# Patient Record
Sex: FEMALE | Race: WHITE | Employment: FULL TIME | URBAN - METROPOLITAN AREA
[De-identification: names, ages, dates, MRNs, and addresses within clinical notes are randomized per-mention and may not be internally consistent; named-entity substitution may affect disease eponyms.]

---

## 2018-07-02 ENCOUNTER — TELEPHONE (OUTPATIENT)
Dept: NEUROLOGY | Age: 48
End: 2018-07-02

## 2018-07-02 ENCOUNTER — OFFICE VISIT (OUTPATIENT)
Dept: NEUROLOGY | Age: 48
End: 2018-07-02

## 2018-07-02 VITALS
BODY MASS INDEX: 35.07 KG/M2 | OXYGEN SATURATION: 96 % | WEIGHT: 236.8 LBS | DIASTOLIC BLOOD PRESSURE: 80 MMHG | SYSTOLIC BLOOD PRESSURE: 110 MMHG | TEMPERATURE: 98 F | HEART RATE: 85 BPM | HEIGHT: 69 IN

## 2018-07-02 DIAGNOSIS — H53.9 VISUAL CHANGES: Primary | ICD-10-CM

## 2018-07-02 DIAGNOSIS — H53.9 VISUAL CHANGES: ICD-10-CM

## 2018-07-02 DIAGNOSIS — G43.011 INTRACTABLE MIGRAINE WITHOUT AURA AND WITH STATUS MIGRAINOSUS: Primary | ICD-10-CM

## 2018-07-02 RX ORDER — MELOXICAM 15 MG/1
15 TABLET ORAL DAILY
COMMUNITY
End: 2019-11-26 | Stop reason: SDUPTHER

## 2018-07-02 RX ORDER — ALPRAZOLAM 0.25 MG/1
TABLET ORAL
COMMUNITY

## 2018-07-02 RX ORDER — DESVENLAFAXINE 100 MG/1
TABLET, EXTENDED RELEASE ORAL
COMMUNITY
End: 2019-11-26 | Stop reason: SDUPTHER

## 2018-07-02 NOTE — PROCEDURES
Carotid Doppler:     Date:  7/2/2018     Requesting Physician:  Vitor Bryant MD     Indication:  Visual disturbance     B-mode imaging reveals mild plaque at the bifurcations bilaterally. Doppler spectral analysis reveals no significant velocity shifts. Vertebral artery flow antegrade bilaterally. Interpretation:    1. Minimal plaque. 2. No significant stenosis.

## 2018-07-02 NOTE — TELEPHONE ENCOUNTER
[facility] Rx PA form w/ clinical notes faxed via [facility] key #  [id number] regarding Zembarce 3mg /0.5ml #8 syringes /30 days    PA status pending

## 2018-07-02 NOTE — TELEPHONE ENCOUNTER
----- Message from Yusra Osborne sent at 7/2/2018  2:23 PM EDT -----  Regarding: Dr. Chau Sutherland refill  The pt was just seen today and went to  the prescription she had at the pharmacy, but was told it needs prior authorization fisrt. Best contact number is [phone].

## 2018-07-02 NOTE — MR AVS SNAPSHOT
[facility] 
 
 
 [place] [address] [id number] [phone] Patient: Radha Terry MRN: [id number] :1970 Visit Information Date & Time Provider Department Dept. Phone Encounter #  
 2018  1:00 PM Leonides Robertson MD [facility] [phone] [id number] Follow-up Instructions Return in about 6 weeks (around 2018). Your Appointments 2018  1:30 PM  
Follow Up with Amalia Choudhary NP  [facility] ([facility]) Appt Note: headaches  follow up [place] [address] [id number] [phone]  
  
   
 [facility] [address] [id number] [address] Upcoming Health Maintenance Date Due DTaP/Tdap/Td series (1 - Tdap) 1993 PAP AKA CERVICAL CYTOLOGY 1993 Influenza Age 5 to Adult 2018 Allergies as of 2018  Review Complete On: 2018 By: Leonides Robertson MD  
 No Known Allergies Current Immunizations  Never Reviewed No immunizations on file. Not reviewed this visit You Were Diagnosed With   
  
 Codes Comments Intractable migraine without aura and with status migrainosus    -  Primary ICD-10-CM: F68.581 
ICD-9-CM: 346.13 Visual changes     ICD-10-CM: H53.9 ICD-9-CM: 368.9 Vitals BP Pulse Temp Height(growth percentile) Weight(growth percentile) SpO2  
 110/80 85 98 °F (36.7 °C) 5' 9\" (1.753 m) 236 lb 12.8 oz (107.4 kg) 96% BMI Smoking Status 34.97 kg/m2 Never Smoker Vitals History BMI and BSA Data Body Mass Index Body Surface Area 34.97 kg/m 2 2.29 m 2 Preferred Pharmacy Pharmacy Name Phone [facility] [address]  [facility] [phone] Your Updated Medication List  
  
   
 This list is accurate as of 18  1:41 PM.  Always use your most recent med list.  
  
  
  
  
 meloxicam 15 mg tablet Commonly known as:  MOBIC Take 15 mg by mouth daily. PRISTIQ 100 mg Tb24 Generic drug:  Desvenlafaxine Take  by mouth. SUMAtriptan succinate 3 mg/0.5 mL Pnij Commonly known as:  Tr [place]rat  
1 at HA onset and repeat in 1 hours prn. May use up to 4 in 24 hours XANAX 0.25 mg tablet Generic drug:  ALPRAZolam  
Take  by mouth. Prescriptions Sent to Pharmacy Refills SUMAtriptan succinate (ZEMBRACE SYMTOUCH) 3 mg/0.5 mL pnij 3 Si at HA onset and repeat in 1 hours prn. May use up to 4 in 24 hours Class: Normal  
 Pharmacy: [facility] [address] [place] [address] [address] AT NEC of [address] ([facility] #: [phone] Follow-up Instructions Return in about 6 weeks (around 2018). To-Do List   
 2018 Imaging:  DUPLEX CAROTID BILATERAL AMB NEURO Introducing \[facility] [place] [facility]\"" & HEALTH SERVICES! [employer] introduces [employer] patient portal. Now you can access parts of your medical record, email your doctor's office, and request medication refills online. 1. In your internet browser, go to [email or web address] 2. Click on the First Time User? Click Here link in the Sign In box. You will see the New Member Sign Up page. 3. Enter your [employer] Access Code exactly as it appears below. You will not need to use this code after youve completed the sign-up process. If you do not sign up before the expiration date, you must request a new code. · [employer] Access Code: [email or web address] Expires: 2018 12:56 PM 
 
4. Enter the last four digits of your Social Security Number (xxxx) and Date of Birth (mm/dd/yyyy) as indicated and click Submit. You will be taken to the next sign-up page. 5. Create a [place] ID.  This will be your [employer] login ID and cannot be changed, so think of one that is secure and easy to remember. 6. Create a RIVS password. You can change your password at any time. 7. Enter your Password Reset Question and Answer. This can be used at a later time if you forget your password. 8. Enter your e-mail address. You will receive e-mail notification when new information is available in [address] 9. Click Sign Up. You can now view and download portions of your medical record. 10. Click the Download Summary menu link to download a portable copy of your medical information. If you have questions, please visit the Frequently Asked Questions section of the [employer] website. Remember, [employer] is NOT to be used for urgent needs. For medical emergencies, dial 911. Now available from your iPhone and Android! Please provide this summary of care documentation to your next provider. Your primary care clinician is listed as Benito Ramsey. If you have any questions after today's visit, please call [phone].

## 2018-07-02 NOTE — PROGRESS NOTES
New patient , states dx with migraines at age 7 , age 27 until  2 Months ago   Able to deal with them. 2 months ago , started 2 times were week . 3 weeks ago started daily, feeling like someone is beating head.  Some relief with fioricet

## 2018-07-02 NOTE — PROGRESS NOTES
[facility] [address]   [place] [address]   [place], [facility] [place]note [address] [address]   [phone] [facility]    Fax             [facility]    Chief Complaint   Patient presents with    Headache     new patient     61-year-old right-handed woman who presents today for evaluation of headaches. She says she was diagnosed with migraine at the age of 7years old. She at that time used Imitrex tablets. Her headaches were fairly frequent and then after she had the birth of her first child she would get one migraine about every 3 months or so. Recently her headaches have significantly changed in terms of frequency. She does note that her mother passed away unexpectedly on April 6 of this year and she was the one to find her. A week later her daughter had her first grandchild and almost had a stroke and there were complications with the baby etc.  She does realize that stress may be playing a role. She does say that she has tempered her stress by taking 2 weeks off from work where she is a [address]. She gets 8-10 hours of sleep nightly. She does meditation and mindfulness. Still she has had frequent headaches. She notes that she is having a headache about 4-5 days a week and about 2 weeks ago when the increase she was having 2-3 headache days per week. She has gone to the emergency department and [facility] for cocktails. She had a head CT that was negative. At [facility] they did an ultrasound of her optic nerve and told her there was a problem there. She did show me a picture of that but I was unable to ascertain what that meant. In any regard she has also had a 14 day steroid pack which seemed to help but then when she titrated down to 2 tablets a day her headaches resumed. No focal deficits. No loss or alteration in consciousness. No numbness or tingling. No palpitations. No cardiac history. Blood pressure and cholesterol are controlled.   She believes she is handling her increased stress with activities as noted. No sleep disruption. No symptoms suggestive of obstructive sleep apnea. She was given Fioricet and noted when she would take one it would seem to dull the headache somewhat but never took it away. Previously when she would get a bad headache she would use 800 mg of Motrin as she took that early she was able to abort the headache but if she did not take it early it would last for days. She has associated photophobia and phonophobia. No significant nausea or vomiting. She is not taking over-the-counter medications. None of her other medications of change she is on Pristiq and Xanax for her anxiety and she has been on that for 5-6 years. She is also on some meloxicam for her arthritis. No recent injury. No fever. No chills. Past Medical History:   Diagnosis Date    Anxiety     Arthritis     Depression     Joint pain     Musculoskeletal disorder     Nausea & vomiting        History reviewed. No pertinent surgical history. Current Outpatient Prescriptions   Medication Sig Dispense Refill    Desvenlafaxine (PRISTIQ) 100 mg Tb24 Take  by mouth.  meloxicam (MOBIC) 15 mg tablet Take 15 mg by mouth daily.  ALPRAZolam (XANAX) 0.25 mg tablet Take  by mouth.  SUMAtriptan succinate (ZEMBRACE SYMTOUCH) 3 mg/0.5 mL pnij 1 at HA onset and repeat in 1 hours prn.   May use up to 4 in 24 hours 8 Syringe 3        No Known Allergies    Social History   Substance Use Topics    Smoking status: Never Smoker    Smokeless tobacco: Never Used    Alcohol use No       Family History   Problem Relation Age of Onset    Cancer Mother     Migraines Mother     Headache Mother     Heart Disease Mother     Stroke Mother     Diabetes Mother        Review of Systems  Pertinent positives and negatives as noted with remainder of comprehensive review negative    Examination  Visit Vitals    /80    Pulse 85    Temp 98 °F (36.7 °C)    Ht 5' 9\" (1.753 m)    Wt 107.4 kg (236 lb 12.8 oz)    SpO2 96%    BMI 34.97 kg/m2     Pleasant, well appearing. No icterus. Oropharynx clear. Supple neck without bruit. Heart regular. No murmur. No edema. Neurologically, she is awake, alert, and oriented with normal speech and language. Her cognition is normal.    Intact cranial nerves 2-12. No nystagmus. Visual fields full to confrontation. Disk margins are flat bilaterally. She has normal bulk and tone. She has no abnormal movement. She has no pronation or drift. She generates full strength in the upper and lower extremities to direct confrontational testing. Reflexes are symmetrical in the upper and lower extremities bilaterally. Her toes are down bilaterally. No [place]. Finger nose finger and rapid alternating movements are normal.  Steady gait. No sensory deficit to primary modalities. Impression/Plan  Very pleasant 53-year-old woman with migraine without aura and with status migrainosus severe to the point that she has had several trips to the emergency department as well as to urgent care and after discussing her headache pattern and frequency of appears to be that she is just unable to successfully abort a headache when it comes and the majority of her headache days are secondary to ongoing headache i.e. status migrainosus. We discussed this today at length. We discussed preventatives. Given the fact that this seems like just ongoing headache and not multiple discrete headaches we will try to abort the headaches more effectively when they present. We discussed options and have decided upon Zembrace 3 mg injection discussing the fact that injectable medications are the fastest onset of the medications we have, and I demonstrated the injector, and we discussed potential side effects, potential benefits and alternatives. Track headaches on a calendar bring that each appointment.   If we are not successful in terminating these headaches abruptly and she continues to have multiple headache days per month we will put her on a preventative. Choices would be topiramate or Aimovig. Secondary to the abnormality that was felt to be seen in the optic nerve or in the eye we will proceed with a Doppler to ensure there is no stenosis affecting the stomach artery. Return in 6 weeks    Danish Clemons MD      This note was created using voice recognition software. Despite editing, there may be syntax errors. This note will not be viewable in [address]

## 2018-07-03 ENCOUNTER — TELEPHONE (OUTPATIENT)
Dept: NEUROLOGY | Age: 48
End: 2018-07-03

## 2018-07-03 NOTE — TELEPHONE ENCOUNTER
Received [facility] Rx PA denial letter regarding Zembrace 3mg/0.5ml #8 syringes /30 days  Reason: patient did not meet the following:   Patient must have tried failed sumatriptan injection       PA case closed unfavorable for patient     Clinical staff has been informed of this matter

## 2018-07-06 ENCOUNTER — TELEPHONE (OUTPATIENT)
Dept: NEUROLOGY | Age: 48
End: 2018-07-06

## 2018-07-06 NOTE — TELEPHONE ENCOUNTER
Pt was returning nurses call. She did advise that she is ok with any RX that you want to try. She said that an E-script can be sent on the RX that is needed. Her best contact is [phone]. Thanks.

## 2018-07-09 ENCOUNTER — TELEPHONE (OUTPATIENT)
Dept: NEUROLOGY | Age: 48
End: 2018-07-09

## 2018-07-09 RX ORDER — SUMATRIPTAN 6 MG/.5ML
6 INJECTION, SOLUTION SUBCUTANEOUS ONCE
Qty: 0.5 ML | Refills: 2 | OUTPATIENT
Start: 2018-07-09 | End: 2018-07-09

## 2018-07-09 NOTE — TELEPHONE ENCOUNTER
Pt notified VO for Imitrex 6 mg injection 1 at HA onset and repeat in 1 hour PRN x 1 max 2 in 24 hours  Number 6 with 2 RF's

## 2018-07-09 NOTE — TELEPHONE ENCOUNTER
----- Message from Kandi Perla sent at 7/9/2018 12:40 PM EDT -----  Regarding: Dr. Joss Doss  The patient is requesting a call back from the nurse with the status of the new medication that is supposed to be called into the pharmacy for her.  [phone]

## 2018-07-09 NOTE — TELEPHONE ENCOUNTER
Received Duplicate PA request regarding zembrace 3mg /0.5 ml #8 syringes /30 days  PA case closed unfavorable for patient on 7/3/18   Reason: patient must try the preferred sumatriptan injectable first     Clinical Staff and [employer] [facility] [employer] ( via Fax)  has been informed of this matter   PA case closed unfavorable for patient

## 2019-11-26 ENCOUNTER — HOSPITAL ENCOUNTER (OUTPATIENT)
Dept: LAB | Age: 49
Discharge: HOME | End: 2019-11-26

## 2019-11-26 ENCOUNTER — OFFICE VISIT (OUTPATIENT)
Dept: FAMILY MEDICINE CLINIC | Age: 49
End: 2019-11-26

## 2019-11-26 VITALS
BODY MASS INDEX: 35.1 KG/M2 | SYSTOLIC BLOOD PRESSURE: 97 MMHG | TEMPERATURE: 98.4 F | RESPIRATION RATE: 18 BRPM | DIASTOLIC BLOOD PRESSURE: 65 MMHG | OXYGEN SATURATION: 98 % | WEIGHT: 237 LBS | HEIGHT: 69 IN | HEART RATE: 72 BPM

## 2019-11-26 DIAGNOSIS — B35.1 FUNGAL TOENAIL INFECTION: ICD-10-CM

## 2019-11-26 DIAGNOSIS — Z00.00 WELL ADULT EXAM: Primary | ICD-10-CM

## 2019-11-26 DIAGNOSIS — F33.9 EPISODE OF RECURRENT MAJOR DEPRESSIVE DISORDER, UNSPECIFIED DEPRESSION EPISODE SEVERITY (HCC): ICD-10-CM

## 2019-11-26 DIAGNOSIS — Z12.31 VISIT FOR SCREENING MAMMOGRAM: ICD-10-CM

## 2019-11-26 DIAGNOSIS — Z00.00 WELL ADULT EXAM: ICD-10-CM

## 2019-11-26 LAB
ALBUMIN SERPL-MCNC: 3.8 G/DL (ref 3.5–5)
ALBUMIN/GLOB SERPL: 1.2 {RATIO} (ref 1.1–2.2)
ALP SERPL-CCNC: 82 U/L (ref 45–117)
ALT SERPL-CCNC: 18 U/L (ref 12–78)
ANION GAP SERPL CALC-SCNC: 5 MMOL/L (ref 5–15)
AST SERPL-CCNC: 12 U/L (ref 15–37)
BASOPHILS # BLD: 0 K/UL (ref 0–0.1)
BASOPHILS NFR BLD: 1 % (ref 0–1)
BILIRUB SERPL-MCNC: 0.6 MG/DL (ref 0.2–1)
BUN SERPL-MCNC: 17 MG/DL (ref 6–20)
BUN/CREAT SERPL: 21 (ref 12–20)
CALCIUM SERPL-MCNC: 8.6 MG/DL (ref 8.5–10.1)
CHLORIDE SERPL-SCNC: 109 MMOL/L (ref 97–108)
CHOLEST SERPL-MCNC: 182 MG/DL
CO2 SERPL-SCNC: 27 MMOL/L (ref 21–32)
CREAT SERPL-MCNC: 0.8 MG/DL (ref 0.55–1.02)
DIFFERENTIAL METHOD BLD: NORMAL
EOSINOPHIL # BLD: 0.2 K/UL (ref 0–0.4)
EOSINOPHIL NFR BLD: 4 % (ref 0–7)
ERYTHROCYTE [DISTWIDTH] IN BLOOD BY AUTOMATED COUNT: 12.6 % (ref 11.5–14.5)
GLOBULIN SER CALC-MCNC: 3.2 G/DL (ref 2–4)
GLUCOSE SERPL-MCNC: 85 MG/DL (ref 65–100)
HCT VFR BLD AUTO: 43 % (ref 35–47)
HDLC SERPL-MCNC: 66 MG/DL
HDLC SERPL: 2.8 {RATIO} (ref 0–5)
HGB BLD-MCNC: 13.5 G/DL (ref 11.5–16)
IMM GRANULOCYTES # BLD AUTO: 0 K/UL (ref 0–0.04)
IMM GRANULOCYTES NFR BLD AUTO: 0 % (ref 0–0.5)
LDLC SERPL CALC-MCNC: 108 MG/DL (ref 0–100)
LIPID PROFILE,FLP: ABNORMAL
LYMPHOCYTES # BLD: 1.3 K/UL (ref 0.8–3.5)
LYMPHOCYTES NFR BLD: 25 % (ref 12–49)
MCH RBC QN AUTO: 29.3 PG (ref 26–34)
MCHC RBC AUTO-ENTMCNC: 31.4 G/DL (ref 30–36.5)
MCV RBC AUTO: 93.5 FL (ref 80–99)
MONOCYTES # BLD: 0.4 K/UL (ref 0–1)
MONOCYTES NFR BLD: 8 % (ref 5–13)
NEUTS SEG # BLD: 3.3 K/UL (ref 1.8–8)
NEUTS SEG NFR BLD: 62 % (ref 32–75)
NRBC # BLD: 0 K/UL (ref 0–0.01)
NRBC BLD-RTO: 0 PER 100 WBC
PLATELET # BLD AUTO: 296 K/UL (ref 150–400)
PMV BLD AUTO: 11.1 FL (ref 8.9–12.9)
POTASSIUM SERPL-SCNC: 4.5 MMOL/L (ref 3.5–5.1)
PROT SERPL-MCNC: 7 G/DL (ref 6.4–8.2)
RBC # BLD AUTO: 4.6 M/UL (ref 3.8–5.2)
SODIUM SERPL-SCNC: 141 MMOL/L (ref 136–145)
TRIGL SERPL-MCNC: 40 MG/DL (ref ?–150)
TSH SERPL DL<=0.05 MIU/L-ACNC: 0.85 UIU/ML (ref 0.36–3.74)
VLDLC SERPL CALC-MCNC: 8 MG/DL
WBC # BLD AUTO: 5.4 K/UL (ref 3.6–11)

## 2019-11-26 RX ORDER — TERBINAFINE HYDROCHLORIDE 250 MG/1
250 TABLET ORAL DAILY
Qty: 90 TAB | Refills: 0 | Status: SHIPPED | OUTPATIENT
Start: 2019-11-26 | End: 2020-02-24

## 2019-11-26 RX ORDER — DESVENLAFAXINE 100 MG/1
1 TABLET, EXTENDED RELEASE ORAL DAILY
Qty: 90 TAB | Refills: 3 | Status: SHIPPED | OUTPATIENT
Start: 2019-11-26 | End: 2020-12-22

## 2019-11-26 RX ORDER — BUPROPION HYDROCHLORIDE 150 MG/1
150 TABLET ORAL
Qty: 30 TAB | Refills: 5 | Status: SHIPPED | OUTPATIENT
Start: 2019-11-26 | End: 2020-07-01

## 2019-11-26 RX ORDER — MELOXICAM 15 MG/1
15 TABLET ORAL DAILY
Qty: 90 TAB | Refills: 3 | Status: SHIPPED | OUTPATIENT
Start: 2019-11-26

## 2019-11-26 NOTE — PROGRESS NOTES
Subjective:   54 y.o. female for Well Woman Check. She is a new patient to the practice today. Her gyne and breast care is done elsewhere by her Ob-Gyne physician. There are no active problems to display for this patient. Current Outpatient Medications   Medication Sig Dispense Refill    L. gasseri-B. bifidum-B longum (HIGGINS' Jahu 85) 1.5 billion cell cap Take  by mouth.  levonorgestrel (MIRENA) 20 mcg/24 hours (5 yrs) 52 mg IUD 1 Device by IntraUTERine route once.  terbinafine HCl (LAMISIL) 250 mg tablet Take 1 Tab by mouth daily for 90 days. 90 Tab 0    buPROPion XL (WELLBUTRIN XL) 150 mg tablet Take 1 Tab by mouth every morning. 30 Tab 5    meloxicam (MOBIC) 15 mg tablet Take 1 Tab by mouth daily. 90 Tab 3    Desvenlafaxine (PRISTIQ) 100 mg Tb24 Take 1 Tab by mouth daily. 90 Tab 3    ALPRAZolam (XANAX) 0.25 mg tablet Take  by mouth.  SUMAtriptan succinate (ZEMBRACE SYMTOUCH) 3 mg/0.5 mL pnij 1 at HA onset and repeat in 1 hours prn. May use up to 4 in 24 hours 6 Syringe 3     Family History   Problem Relation Age of Onset    Cancer Mother     Migraines Mother     Headache Mother     Heart Disease Mother     Stroke Mother     Diabetes Mother      Social History     Tobacco Use    Smoking status: Never Smoker    Smokeless tobacco: Never Used   Substance Use Topics    Alcohol use: Yes     Comment: sometimes         ROS: Feeling generally well. No TIA's or unusual headaches, no dysphagia. No prolonged cough. No dyspnea or chest pain on exertion. No abdominal pain, change in bowel habits, black or bloody stools. No urinary tract symptoms. No new or unusual musculoskeletal symptoms. Specific concerns today: needs to get refills of meds; she thinks she needs to add something to the Pristiq for increase in stress and depression. Objective: The patient appears well, alert, oriented x 3, in no distress.   Visit Vitals  BP 97/65 (BP 1 Location: Left arm, BP Patient Position: Sitting)   Pulse 72   Temp 98.4 °F (36.9 °C) (Oral)   Resp 18   Ht 5' 9\" (1.753 m)   Wt 237 lb (107.5 kg)   SpO2 98%   BMI 35.00 kg/m²     ENT normal.  Neck supple. No adenopathy or thyromegaly. DIANA. Lungs are clear, good air entry, no wheezes, rhonchi or rales. S1 and S2 normal, no murmurs, regular rate and rhythm. Abdomen soft without tenderness, guarding, mass or organomegaly. Extremities show no edema, normal peripheral pulses. Neurological is normal, no focal findings. Breast and Pelvic exams are deferred. Assessment/Plan:   Well Woman  lose weight, increase physical activity, follow low fat diet, follow low salt diet, routine labs ordered  Encounter Diagnoses   Name Primary?  Well adult exam Yes    Fungal toenail infection     Episode of recurrent major depressive disorder, unspecified depression episode severity ([facility] [address]     Visit for screening mammogram      Orders Placed This Encounter    SHANNON MAMMO BI SCREENING INCL CAD    LIPID PANEL    CBC WITH AUTOMATED DIFF    METABOLIC PANEL, COMPREHENSIVE    TSH 3RD GENERATION    L. gasseri-B. bifidum-B longum (HIGGINS' Jahu 85) 1.5 billion cell cap    levonorgestrel (MIRENA) 20 mcg/24 hours (5 yrs) 52 mg IUD    terbinafine HCl (LAMISIL) 250 mg tablet    buPROPion XL (WELLBUTRIN XL) 150 mg tablet    meloxicam (MOBIC) 15 mg tablet    Desvenlafaxine (PRISTIQ) 100 mg Tb24     Labs updated  Given wellbutrin for added depression benefit  Reviewed adr/se of med  Dev plan with lab results    I have discussed the diagnosis with the patient and the intended plan as seen in the above orders. The patient has received an after-visit summary and questions were answered concerning future plans. Patient conveyed understanding of the plan at the time of the visit.     Jama Kaiser, MSN, ANP  11/26/2019

## 2019-11-26 NOTE — PROGRESS NOTES
Chief Complaint   Patient presents with    Establish Care    Ingrown Toenail    Depression     Pt in office today to establish care  -pt has concerns that she might have an ingrown toenail  -pt states that she has been having more issues with her depression  -pt states that she would like to see about adding something     1. Have you been to the ER, urgent care clinic since your last visit? Hospitalized since your last visit? Pt used online doctor for uti    2. Have you seen or consulted any other health care providers outside of the [address] [place] [address] since your last visit? Include any pap smears or colon screening.  No     Pt has no other concerns

## 2019-11-29 NOTE — PROGRESS NOTES
Hey there, your labs look great overall. The cholesterol is borderline high. Watch the diet for red meat/pork, dairy products, and fried/fast foods. Recheck 6 months.    Mary Perez

## 2020-07-01 RX ORDER — BUPROPION HYDROCHLORIDE 150 MG/1
TABLET ORAL
Qty: 30 TAB | Refills: 5 | Status: SHIPPED | OUTPATIENT
Start: 2020-07-01 | End: 2020-09-21 | Stop reason: SDUPTHER

## 2020-09-21 RX ORDER — BUPROPION HYDROCHLORIDE 150 MG/1
TABLET ORAL
Qty: 30 TAB | Refills: 5 | Status: SHIPPED | OUTPATIENT
Start: 2020-09-21 | End: 2020-10-22 | Stop reason: SDUPTHER

## 2020-10-22 RX ORDER — BUPROPION HYDROCHLORIDE 150 MG/1
TABLET ORAL
Qty: 90 TAB | Refills: 3 | Status: SHIPPED | OUTPATIENT
Start: 2020-10-22

## 2020-12-22 RX ORDER — DESVENLAFAXINE 100 MG/1
TABLET, EXTENDED RELEASE ORAL
Qty: 90 TAB | Refills: 1 | Status: SHIPPED | OUTPATIENT
Start: 2020-12-22

## 2021-07-21 ENCOUNTER — TRANSCRIBE ORDER (OUTPATIENT)
Dept: SCHEDULING | Age: 51
End: 2021-07-21

## 2021-07-21 DIAGNOSIS — Z12.31 SCREENING MAMMOGRAM FOR HIGH-RISK PATIENT: Primary | ICD-10-CM

## 2021-07-22 ENCOUNTER — HOSPITAL ENCOUNTER (OUTPATIENT)
Dept: MAMMOGRAPHY | Age: 51
Discharge: HOME | End: 2021-07-22
Attending: NURSE PRACTITIONER
Payer: COMMERCIAL

## 2021-07-22 DIAGNOSIS — Z12.31 SCREENING MAMMOGRAM FOR HIGH-RISK PATIENT: ICD-10-CM

## 2021-07-22 PROCEDURE — 77063 BREAST TOMOSYNTHESIS BI: CPT

## 2022-09-09 ENCOUNTER — APPOINTMENT (OUTPATIENT)
Dept: GENERAL RADIOLOGY | Age: 52
End: 2022-09-09
Attending: EMERGENCY MEDICINE

## 2022-09-09 ENCOUNTER — HOSPITAL ENCOUNTER (EMERGENCY)
Age: 52
Discharge: HOME | End: 2022-09-09
Attending: EMERGENCY MEDICINE

## 2022-09-09 VITALS
DIASTOLIC BLOOD PRESSURE: 71 MMHG | OXYGEN SATURATION: 97 % | HEIGHT: 69 IN | HEART RATE: 64 BPM | SYSTOLIC BLOOD PRESSURE: 128 MMHG | RESPIRATION RATE: 16 BRPM | WEIGHT: 220 LBS | BODY MASS INDEX: 32.58 KG/M2 | TEMPERATURE: 97.7 F

## 2022-09-09 DIAGNOSIS — R06.2 WHEEZING: ICD-10-CM

## 2022-09-09 DIAGNOSIS — J06.9 VIRAL UPPER RESPIRATORY TRACT INFECTION: Primary | ICD-10-CM

## 2022-09-09 LAB
ALBUMIN SERPL-MCNC: 3.4 G/DL (ref 3.5–5)
ALBUMIN/GLOB SERPL: 0.9 {RATIO} (ref 1.1–2.2)
ALP SERPL-CCNC: 92 U/L (ref 45–117)
ALT SERPL-CCNC: 22 U/L (ref 12–78)
ANION GAP SERPL CALC-SCNC: 5 MMOL/L (ref 5–15)
AST SERPL-CCNC: 13 U/L (ref 15–37)
ATRIAL RATE: 59 BPM
BASOPHILS # BLD: 0.1 K/UL (ref 0–0.1)
BASOPHILS NFR BLD: 1 % (ref 0–1)
BILIRUB SERPL-MCNC: 0.4 MG/DL (ref 0.2–1)
BUN SERPL-MCNC: 17 MG/DL (ref 6–20)
BUN/CREAT SERPL: 19 (ref 12–20)
CALCIUM SERPL-MCNC: 8.7 MG/DL (ref 8.5–10.1)
CALCULATED P AXIS, ECG09: 0 DEGREES
CALCULATED R AXIS, ECG10: 41 DEGREES
CALCULATED T AXIS, ECG11: 38 DEGREES
CHLORIDE SERPL-SCNC: 110 MMOL/L (ref 97–108)
CO2 SERPL-SCNC: 24 MMOL/L (ref 21–32)
COMMENT, HOLDF: NORMAL
CREAT SERPL-MCNC: 0.88 MG/DL (ref 0.55–1.02)
DIAGNOSIS, 93000: NORMAL
DIFFERENTIAL METHOD BLD: ABNORMAL
EOSINOPHIL # BLD: 0.3 K/UL (ref 0–0.4)
EOSINOPHIL NFR BLD: 3 % (ref 0–7)
ERYTHROCYTE [DISTWIDTH] IN BLOOD BY AUTOMATED COUNT: 13.2 % (ref 11.5–14.5)
GLOBULIN SER CALC-MCNC: 3.8 G/DL (ref 2–4)
GLUCOSE SERPL-MCNC: 90 MG/DL (ref 65–100)
HCT VFR BLD AUTO: 43.8 % (ref 35–47)
HGB BLD-MCNC: 14.5 G/DL (ref 11.5–16)
IMM GRANULOCYTES # BLD AUTO: 0.1 K/UL (ref 0–0.04)
IMM GRANULOCYTES NFR BLD AUTO: 1 % (ref 0–0.5)
LYMPHOCYTES # BLD: 2.4 K/UL (ref 0.8–3.5)
LYMPHOCYTES NFR BLD: 25 % (ref 12–49)
MAGNESIUM SERPL-MCNC: 2.2 MG/DL (ref 1.6–2.4)
MCH RBC QN AUTO: 29.2 PG (ref 26–34)
MCHC RBC AUTO-ENTMCNC: 33.1 G/DL (ref 30–36.5)
MCV RBC AUTO: 88.1 FL (ref 80–99)
MONOCYTES # BLD: 0.6 K/UL (ref 0–1)
MONOCYTES NFR BLD: 7 % (ref 5–13)
NEUTS SEG # BLD: 6.1 K/UL (ref 1.8–8)
NEUTS SEG NFR BLD: 63 % (ref 32–75)
NRBC # BLD: 0 K/UL (ref 0–0.01)
NRBC BLD-RTO: 0 PER 100 WBC
P-R INTERVAL, ECG05: 150 MS
PLATELET # BLD AUTO: 363 K/UL (ref 150–400)
PMV BLD AUTO: 9.9 FL (ref 8.9–12.9)
POTASSIUM SERPL-SCNC: 4.2 MMOL/L (ref 3.5–5.1)
PROT SERPL-MCNC: 7.2 G/DL (ref 6.4–8.2)
Q-T INTERVAL, ECG07: 404 MS
QRS DURATION, ECG06: 86 MS
QTC CALCULATION (BEZET), ECG08: 399 MS
RBC # BLD AUTO: 4.97 M/UL (ref 3.8–5.2)
SAMPLES BEING HELD,HOLD: NORMAL
SODIUM SERPL-SCNC: 139 MMOL/L (ref 136–145)
TROPONIN-HIGH SENSITIVITY: 6 NG/L (ref 0–51)
VENTRICULAR RATE, ECG03: 59 BPM
WBC # BLD AUTO: 9.6 K/UL (ref 3.6–11)

## 2022-09-09 PROCEDURE — 94640 AIRWAY INHALATION TREATMENT: CPT

## 2022-09-09 PROCEDURE — 85025 COMPLETE CBC W/AUTO DIFF WBC: CPT

## 2022-09-09 PROCEDURE — 93005 ELECTROCARDIOGRAM TRACING: CPT

## 2022-09-09 PROCEDURE — 80053 COMPREHEN METABOLIC PANEL: CPT

## 2022-09-09 PROCEDURE — 36415 COLL VENOUS BLD VENIPUNCTURE: CPT

## 2022-09-09 PROCEDURE — 74011000250 HC RX REV CODE- 250: Performed by: EMERGENCY MEDICINE

## 2022-09-09 PROCEDURE — 84484 ASSAY OF TROPONIN QUANT: CPT

## 2022-09-09 PROCEDURE — 99285 EMERGENCY DEPT VISIT HI MDM: CPT

## 2022-09-09 PROCEDURE — 71046 X-RAY EXAM CHEST 2 VIEWS: CPT

## 2022-09-09 PROCEDURE — 83735 ASSAY OF MAGNESIUM: CPT

## 2022-09-09 RX ORDER — ALBUTEROL SULFATE 90 UG/1
2 AEROSOL, METERED RESPIRATORY (INHALATION)
Qty: 18 G | Refills: 0 | Status: SHIPPED | OUTPATIENT
Start: 2022-09-09

## 2022-09-09 RX ORDER — IPRATROPIUM BROMIDE AND ALBUTEROL SULFATE 2.5; .5 MG/3ML; MG/3ML
3 SOLUTION RESPIRATORY (INHALATION)
Status: COMPLETED | OUTPATIENT
Start: 2022-09-09 | End: 2022-09-09

## 2022-09-09 RX ADMIN — IPRATROPIUM BROMIDE AND ALBUTEROL SULFATE 3 ML: .5; 3 SOLUTION RESPIRATORY (INHALATION) at 11:14

## 2022-09-09 NOTE — ED TRIAGE NOTES
Pt reports nasal and chest congestion with chest tightness and wheezing worsening over the past few days. Referred here by [facility] for an abnormal EKG. Recent antibiotic and steroid course for ear infection and congestion. Denies chest pain or SOB.

## 2022-09-09 NOTE — ED PROVIDER NOTES
Alberta Torres is a 49 yo F with recent URI and otitis media recently completed Augmentin and prednisone who presents to the ED From [facility] for concern for chest tightness with EKG changes. She sates her nasal and chest congestion ad increased over the past few days and today she also noticed wheezing which had prompted her to go to [facility] to inquire whether she needed albuterol. She denies prior history of albuterol use. Past Medical History:   Diagnosis Date    Anxiety     Arthritis     Depression     Joint pain     Musculoskeletal disorder     Nausea & vomiting        No past surgical history on file. Family History:   Problem Relation Age of Onset    Cancer Mother     Migraines Mother     Headache Mother     Heart Disease Mother     Stroke Mother     Diabetes Mother     Breast Cancer Mother     Breast Cancer Paternal Grandmother        Social History     Socioeconomic History    Marital status:      Spouse name: Not on file    Number of children: Not on file    Years of education: Not on file    Highest education level: Not on file   Occupational History    Not on file   Tobacco Use    Smoking status: Never    Smokeless tobacco: Never   Substance and Sexual Activity    Alcohol use: Yes     Comment: sometimes    Drug use: Never    Sexual activity: Yes     Partners: Male     Birth control/protection: I.U.D. Other Topics Concern    Not on file   Social History Narrative    Not on file     Social Determinants of Health     Financial Resource Strain: Not on file   Food Insecurity: Not on file   Transportation Needs: Not on file   Physical Activity: Not on file   Stress: Not on file   Social Connections: Not on file   Intimate Partner Violence: Not on file   Housing Stability: Not on file         ALLERGIES: Patient has no known allergies. Review of Systems   Constitutional:  Negative for fever. HENT:  Positive for congestion and sinus pressure. Negative for sore throat.     Eyes: Negative for visual disturbance. Respiratory:  Positive for cough and chest tightness. Cardiovascular:  Negative for chest pain. Gastrointestinal:  Negative for abdominal pain. Genitourinary:  Negative for dysuria. Musculoskeletal:  Negative for back pain. Skin:  Negative for rash. Neurological:  Negative for headaches. Vitals:    09/09/22 1059   BP: 128/71   Pulse: 64   Resp: 16   Temp: 97.7 °F (36.5 °C)   SpO2: 97%   Weight: 99.8 kg (220 lb)   Height: 5' 9\" (1.753 m)            Physical Exam  Vitals and nursing note reviewed. Constitutional:       General: She is not in acute distress. Appearance: She is well-developed. HENT:      Head: Normocephalic and atraumatic. Mouth/Throat:      Mouth: Mucous membranes are moist.   Eyes:      Extraocular Movements: Extraocular movements intact. Conjunctiva/sclera: Conjunctivae normal.   Neck:      Trachea: Phonation normal.   Cardiovascular:      Rate and Rhythm: Normal rate. Pulmonary:      Effort: Pulmonary effort is normal. No respiratory distress. Breath sounds: Examination of the right-upper field reveals wheezing. Examination of the right-middle field reveals wheezing. Wheezing present. Abdominal:      General: There is no distension. Musculoskeletal:         General: No tenderness. Normal range of motion. Cervical back: Normal range of motion. Skin:     General: Skin is warm and dry. Neurological:      General: No focal deficit present. Mental Status: She is alert. She is not disoriented. Motor: No abnormal muscle tone. ED EKG interpretation:  Rhythm: sinus bradycardia; and regular . Rate (approx.): 59; Axis: normal; P wave: normal; QRS interval: normal ; ST/T wave: normal; Other findings: otherwise normal. This EKG was interpreted by Ana Lewis MD,ED Provider. [facility]         12:31 PM  PAtient reassessed and feels much better. No wheezing after duoneb. CXR normal.  Troponin 6.   Will discharge home with prescription for albuterol MDI.      Procedures

## 2024-07-30 ENCOUNTER — OFFICE VISIT (OUTPATIENT)
Age: 54
End: 2024-07-30

## 2024-07-30 VITALS
SYSTOLIC BLOOD PRESSURE: 107 MMHG | RESPIRATION RATE: 20 BRPM | DIASTOLIC BLOOD PRESSURE: 65 MMHG | WEIGHT: 234.2 LBS | TEMPERATURE: 98.6 F | BODY MASS INDEX: 34.69 KG/M2 | HEART RATE: 88 BPM | OXYGEN SATURATION: 95 % | HEIGHT: 69 IN

## 2024-07-30 DIAGNOSIS — J02.9 SORE THROAT: Primary | ICD-10-CM

## 2024-07-30 RX ORDER — ONDANSETRON 4 MG/1
4 TABLET, FILM COATED ORAL 3 TIMES DAILY PRN
Qty: 15 TABLET | Refills: 0 | Status: SHIPPED | OUTPATIENT
Start: 2024-07-30

## 2024-07-30 ASSESSMENT — ENCOUNTER SYMPTOMS
SHORTNESS OF BREATH: 0
RHINORRHEA: 1
COUGH: 1
SORE THROAT: 1
WHEEZING: 0
CHEST TIGHTNESS: 0

## 2024-07-30 NOTE — PATIENT INSTRUCTIONS
Please follow up with your primary care provider within 2-5 days if your signs and symptoms have not resolved or worsened.     Please go immediately to the Emergency Department if you develop shortness of breath, chest pain and uncontrollable fever above 100.4F.     Nasal Congestion:  Flonase (over the counter) nasal spray, once a day  Saline irrigation kits help wash out sinuses 1-2 times a day  Normal saline nasal spray     Cough:  Throat lozenges, hot tea, and honey may help  Vicks VapoRub at night to help with cough and relieve muscles aches and pain  If not prescribed a cough medication, Delsym is an option.  It is an over the counter cough medication containing dextromethorphan to help suppress cough at night              If you have high blood pressure, take Coricidin HBP (or the generic form) instead.  Follow instructions on the box.     Congestion:  For thick mucus, take Mucinex (with Guafenesin only) to help thin the mucus.  Follow instructions on the box.  You will need to drink plenty of water with this medication.     Sore Throat:  Lozenges, as needed. Cepacol lozenges will help numb the throat  Chloraseptic spray also helps to numb throat pain  Salt water gargles to soothe throat pain     Sinus pain/pressure:  Warm, wet towel on face to help with facial sinus pain/pressure  Neti Pot or Saline Rinse can be helpful      Headache/Pain Fever/Body Aches:  If you can take NSAIDs, take Ibuprofen 400-800mg every 8 hours as needed  If you cannot take NSAIDs, take Tylenol 325-500mg every 6 hours as needed     Miscellanous:  Zyrtec/Xyzal/Allegra/Claritin during the day or Benadryl at night may help with allergies  Simple foods like chicken noodle soup, smoothies, hot tea with lemon and honey may also help          Thank you for choosing [facility].  I hope you feel better soon!

## 2024-07-30 NOTE — PROGRESS NOTES
2024   Rivka Quiorz (: 1970) is a 54 y.o. female, New patient, here for evaluation of the following chief complaint(s):  Congestion and Pharyngitis (Patient traveled to [facility], [facility] started ,  intense HA, congestion, body aches, home covid test at noon yesterday was negative. Took OTC ibuprofen and her migraine medicine, not working.)     ASSESSMENT/PLAN:  Below is the assessment and plan developed based on review of pertinent history, physical exam, labs, studies, and medications.  1. Sore throat  -     AMB POC STREP GO A DIRECT, DNA PROBE  -     POCT Influenza A/B Antigen  -     POCT COVID-19, Antigen         Handout given with care instructions  2. OTC for symptom management. Increase fluid intake, ensure adequate nutritional intake.  3. Follow up with PCP as needed.  4. Go to ED with development of any acute symptoms.     Follow up:  No follow-ups on file.  Follow up immediately for any new, worsening or changes or if symptoms are not improving over the next 5-7 days.     SUBJECTIVE/OBJECTIVE:    Pharyngitis  Associated symptoms: congestion, cough, fatigue, myalgias, rhinorrhea and sore throat    Associated symptoms: no fever, no shortness of breath and no wheezing         Diagnoses and all orders for this visit:  Sore throat  -     AMB POC STREP GO A DIRECT, DNA PROBE  -     POCT Influenza A/B Antigen  -     POCT COVID-19, Antigen      Congestion and Pharyngitis (Patient traveled to [facility], [facility] started ,  intense HA, congestion, body aches, home covid test at noon yesterday was negative. Took OTC ibuprofen and her migraine medicine, not working.)      No results found for any visits on 24.    No results found for this visit on 24.  XR Results (most recent):  @[facility]([id number])@         Review of Systems   Constitutional:  Positive for chills and fatigue. Negative for fever.   HENT:  Positive for congestion, postnasal drip, rhinorrhea, sneezing and sore  25.1

## 2024-08-10 ENCOUNTER — OFFICE VISIT (OUTPATIENT)
Age: 54
End: 2024-08-10

## 2024-08-10 VITALS
TEMPERATURE: 98.6 F | SYSTOLIC BLOOD PRESSURE: 119 MMHG | OXYGEN SATURATION: 95 % | RESPIRATION RATE: 20 BRPM | HEIGHT: 69 IN | WEIGHT: 230 LBS | DIASTOLIC BLOOD PRESSURE: 65 MMHG | HEART RATE: 100 BPM | BODY MASS INDEX: 34.07 KG/M2

## 2024-08-10 DIAGNOSIS — F41.0 PANIC ATTACK AS REACTION TO STRESS: Primary | ICD-10-CM

## 2024-08-10 DIAGNOSIS — F43.0 PANIC ATTACK AS REACTION TO STRESS: Primary | ICD-10-CM

## 2024-08-10 DIAGNOSIS — F41.9 ANXIOUSNESS: ICD-10-CM

## 2024-08-10 RX ORDER — HYDROXYZINE HYDROCHLORIDE 25 MG/1
25 TABLET, FILM COATED ORAL
Qty: 7 TABLET | Refills: 0 | Status: SHIPPED | OUTPATIENT
Start: 2024-08-10 | End: 2024-08-17

## 2024-08-10 NOTE — PROGRESS NOTES
Automatic Reconciliation, Ar   levonorgestrel (MIRENA) IUD 52 mg 1 Device by IntraUTERine route once    Automatic Reconciliation, Ar   meloxicam (MOBIC) 15 MG tablet Take 15 mg by mouth daily  Patient not taking: Reported on 7/30/2024 11/26/19   Automatic Reconciliation, Ar   SUMAtriptan Succinate 3 MG/0.5ML SOAJ 1 at HA onset and repeat in 1 hours prn.  May use up to 4 in 24 hours 7/2/18   Automatic Reconciliation, Ar        Follow up if symptoms persist or if symptoms worsen.    I ADVISED PATIENT TO GO TO ER IF SYMPTOMS WORSEN , CHANGE OR FAILS TO IMPROVE.    I have discussed the diagnosis with the patient and the intended plan as seen in the above orders. The patient has received an after-visit summary and questions were answered concerning future plans. I have discussed medication side effects and warnings with the patient as well. The patient agrees and understands above plan.     An electronic signature was used to authenticate this note.    STACIE Hilario - NP

## 2024-08-10 NOTE — PATIENT INSTRUCTIONS
- Call or message primary care via [facility] to get a telehealth appointment or sooner follow up to discuss medications    - Consider reaching out to [facility] for therapy/counseling    - Consider mindfulness exercises and doing things you enjoy doing, journaling, walking etc.     - If symptoms worsen go to ER.

## 2025-03-19 ENCOUNTER — TRANSCRIBE ORDERS (OUTPATIENT)
Facility: HOSPITAL | Age: 55
End: 2025-03-19

## 2025-03-19 DIAGNOSIS — Z12.31 OTHER SCREENING MAMMOGRAM: Primary | ICD-10-CM
